# Patient Record
Sex: FEMALE | Race: WHITE | NOT HISPANIC OR LATINO
[De-identification: names, ages, dates, MRNs, and addresses within clinical notes are randomized per-mention and may not be internally consistent; named-entity substitution may affect disease eponyms.]

---

## 2022-07-20 DIAGNOSIS — Z92.89 PERSONAL HISTORY OF OTHER MEDICAL TREATMENT: ICD-10-CM

## 2022-07-20 DIAGNOSIS — Z82.3 FAMILY HISTORY OF STROKE: ICD-10-CM

## 2022-07-20 DIAGNOSIS — R21 RASH AND OTHER NONSPECIFIC SKIN ERUPTION: ICD-10-CM

## 2022-07-20 DIAGNOSIS — Z01.89 ENCOUNTER FOR OTHER SPECIFIED SPECIAL EXAMINATIONS: ICD-10-CM

## 2022-07-20 DIAGNOSIS — Z80.0 FAMILY HISTORY OF MALIGNANT NEOPLASM OF DIGESTIVE ORGANS: ICD-10-CM

## 2022-07-20 DIAGNOSIS — Z78.9 OTHER SPECIFIED HEALTH STATUS: ICD-10-CM

## 2022-07-20 DIAGNOSIS — Z87.891 PERSONAL HISTORY OF NICOTINE DEPENDENCE: ICD-10-CM

## 2022-07-20 RX ORDER — MONTELUKAST 10 MG/1
10 TABLET, FILM COATED ORAL
Refills: 0 | Status: ACTIVE | COMMUNITY

## 2022-07-26 ENCOUNTER — APPOINTMENT (OUTPATIENT)
Dept: INTERNAL MEDICINE | Facility: CLINIC | Age: 67
End: 2022-07-26

## 2022-07-26 ENCOUNTER — NON-APPOINTMENT (OUTPATIENT)
Age: 67
End: 2022-07-26

## 2022-07-26 VITALS
HEIGHT: 64.5 IN | WEIGHT: 190 LBS | BODY MASS INDEX: 32.04 KG/M2 | TEMPERATURE: 98 F | RESPIRATION RATE: 18 BRPM | DIASTOLIC BLOOD PRESSURE: 76 MMHG | OXYGEN SATURATION: 98 % | HEART RATE: 88 BPM | SYSTOLIC BLOOD PRESSURE: 144 MMHG

## 2022-07-26 DIAGNOSIS — Z00.00 ENCOUNTER FOR GENERAL ADULT MEDICAL EXAMINATION W/OUT ABNORMAL FINDINGS: ICD-10-CM

## 2022-07-26 LAB
BILIRUB UR QL STRIP: NEGATIVE
CLARITY UR: CLEAR
COLLECTION METHOD: NORMAL
GLUCOSE UR-MCNC: NEGATIVE
HCG UR QL: 0.2 EU/DL
HGB UR QL STRIP.AUTO: NEGATIVE
KETONES UR-MCNC: NEGATIVE
LEUKOCYTE ESTERASE UR QL STRIP: NEGATIVE
NITRITE UR QL STRIP: NEGATIVE
PH UR STRIP: 5
PROT UR STRIP-MCNC: NEGATIVE
SP GR UR STRIP: 1

## 2022-07-26 PROCEDURE — 93000 ELECTROCARDIOGRAM COMPLETE: CPT | Mod: 59

## 2022-07-26 PROCEDURE — 81003 URINALYSIS AUTO W/O SCOPE: CPT | Mod: QW

## 2022-07-26 PROCEDURE — 99213 OFFICE O/P EST LOW 20 MIN: CPT | Mod: 25

## 2022-07-26 PROCEDURE — 36415 COLL VENOUS BLD VENIPUNCTURE: CPT

## 2022-07-26 PROCEDURE — G0444 DEPRESSION SCREEN ANNUAL: CPT | Mod: 59

## 2022-07-26 PROCEDURE — G0439: CPT

## 2022-07-26 RX ORDER — METRONIDAZOLE 7.5 MG/G
0.75 GEL TOPICAL
Qty: 45 | Refills: 0 | Status: ACTIVE | COMMUNITY
Start: 2022-05-05

## 2022-07-26 RX ORDER — METRONIDAZOLE 10 MG/G
1 GEL TOPICAL
Refills: 0 | Status: DISCONTINUED | COMMUNITY
End: 2022-07-26

## 2022-07-26 RX ORDER — BRIMONIDINE TARTRATE 1 MG/ML
0.1 SOLUTION/ DROPS OPHTHALMIC
Qty: 15 | Refills: 0 | Status: ACTIVE | COMMUNITY
Start: 2022-05-04

## 2022-07-26 RX ORDER — FLUTICASONE PROPIONATE AND SALMETEROL 50; 250 UG/1; UG/1
250-50 POWDER RESPIRATORY (INHALATION)
Qty: 180 | Refills: 0 | Status: ACTIVE | COMMUNITY
Start: 2022-04-11

## 2022-07-26 RX ORDER — ALBUTEROL SULFATE 90 UG/1
108 (90 BASE) INHALANT RESPIRATORY (INHALATION)
Qty: 8 | Refills: 0 | Status: ACTIVE | COMMUNITY
Start: 2022-07-19

## 2022-07-26 NOTE — HEALTH RISK ASSESSMENT
[Never] : Never [Yes] : Yes [2 - 3 times a week (3 pts)] : 2 - 3  times a week (3 points) [1 or 2 (0 pts)] : 1 or 2 (0 points) [Never (0 pts)] : Never (0 points) [No] : In the past 12 months have you used drugs other than those required for medical reasons? No [No falls in past year] : Patient reported no falls in the past year [0] : 2) Feeling down, depressed, or hopeless: Not at all (0) [PHQ-2 Negative - No further assessment needed] : PHQ-2 Negative - No further assessment needed [Patient reported colonoscopy was normal] : Patient reported colonoscopy was normal [HIV test declined] : HIV test declined [Hepatitis C test declined] : Hepatitis C test declined [Fully functional (bathing, dressing, toileting, transferring, walking, feeding)] : Fully functional (bathing, dressing, toileting, transferring, walking, feeding) [Fully functional (using the telephone, shopping, preparing meals, housekeeping, doing laundry, using] : Fully functional and needs no help or supervision to perform IADLs (using the telephone, shopping, preparing meals, housekeeping, doing laundry, using transportation, managing medications and managing finances) [Patient/Caregiver not ready to engage] : , patient/caregiver not ready to engage [Reviewed no changes] : Reviewed, no changes [I will adhere to the patient's wishes.] : I will adhere to the patient's wishes. [Time Spent: ___ minutes] : Time Spent: [unfilled] minutes [[id number]] : 0 [ColonoscopyDate] : 12/18 [ColonoscopyComments] : next 10 yrs [AdvancecareDate] : 7/26/22 [FreeTextEntry4] : will do forms.\par Discussed with patient.  Suggested the discussion with the healthcare proxy about quality of life issues.  Suggested a discussion on withholding treatment and no further therapy in any given circumstance as determined by the patient.\par

## 2022-07-26 NOTE — PHYSICAL EXAM
[Normal] : normal gait, coordination grossly intact, no focal deficits [de-identified] : declined

## 2022-07-26 NOTE — HISTORY OF PRESENT ILLNESS
[de-identified] : Patient comes for a physical after a long absence.  Has been followed by pulmonary physician and continues on inhaler therapy without significant changes in respiratory effort or exertional effort.  Also followed regularly by dermatology.\par Recently noted to have elevated blood pressure at a physician's office of approximately 140s/80.  No symptoms.\par Will be going to [facility] to get the shingles vaccine, wants to wait because recently had second booster of COVID.\par

## 2022-07-27 LAB
ALBUMIN SERPL ELPH-MCNC: 4.6 G/DL
ALP BLD-CCNC: 71 U/L
ALT SERPL-CCNC: 19 U/L
ANION GAP SERPL CALC-SCNC: 12 MMOL/L
AST SERPL-CCNC: 21 U/L
BASOPHILS # BLD AUTO: 0.02 K/UL
BASOPHILS NFR BLD AUTO: 0.5 %
BILIRUB SERPL-MCNC: 0.3 MG/DL
BUN SERPL-MCNC: 14 MG/DL
CALCIUM SERPL-MCNC: 10 MG/DL
CHLORIDE SERPL-SCNC: 105 MMOL/L
CHOLEST SERPL-MCNC: 226 MG/DL
CO2 SERPL-SCNC: 22 MMOL/L
CREAT SERPL-MCNC: 0.92 MG/DL
EGFR: 68 ML/MIN/1.73M2
EOSINOPHIL # BLD AUTO: 0.22 K/UL
EOSINOPHIL NFR BLD AUTO: 5.7 %
ESTIMATED AVERAGE GLUCOSE: 114 MG/DL
GLUCOSE SERPL-MCNC: 99 MG/DL
HBA1C MFR BLD HPLC: 5.6 %
HCT VFR BLD CALC: 42.5 %
HDLC SERPL-MCNC: 65 MG/DL
HGB BLD-MCNC: 14 G/DL
IMM GRANULOCYTES NFR BLD AUTO: 0.3 %
LDLC SERPL CALC-MCNC: 129 MG/DL
LYMPHOCYTES # BLD AUTO: 1.2 K/UL
LYMPHOCYTES NFR BLD AUTO: 31 %
MAN DIFF?: NORMAL
MCHC RBC-ENTMCNC: 31.6 PG
MCHC RBC-ENTMCNC: 32.9 GM/DL
MCV RBC AUTO: 95.9 FL
MONOCYTES # BLD AUTO: 0.43 K/UL
MONOCYTES NFR BLD AUTO: 11.1 %
NEUTROPHILS # BLD AUTO: 1.99 K/UL
NEUTROPHILS NFR BLD AUTO: 51.4 %
NONHDLC SERPL-MCNC: 161 MG/DL
PLATELET # BLD AUTO: 294 K/UL
POTASSIUM SERPL-SCNC: 4.6 MMOL/L
PROT SERPL-MCNC: 7.5 G/DL
RBC # BLD: 4.43 M/UL
RBC # FLD: 13.6 %
SODIUM SERPL-SCNC: 139 MMOL/L
TRIGL SERPL-MCNC: 161 MG/DL
TSH SERPL-ACNC: 1.33 UIU/ML
WBC # FLD AUTO: 3.87 K/UL

## 2022-08-05 DIAGNOSIS — M85.80 OTHER SPECIFIED DISORDERS OF BONE DENSITY AND STRUCTURE, UNSPECIFIED SITE: ICD-10-CM

## 2022-08-18 ENCOUNTER — RESULT REVIEW (OUTPATIENT)
Age: 67
End: 2022-08-18

## 2022-08-24 ENCOUNTER — APPOINTMENT (OUTPATIENT)
Dept: INTERNAL MEDICINE | Facility: CLINIC | Age: 67
End: 2022-08-24

## 2022-08-24 VITALS
RESPIRATION RATE: 16 BRPM | HEART RATE: 71 BPM | DIASTOLIC BLOOD PRESSURE: 88 MMHG | OXYGEN SATURATION: 97 % | SYSTOLIC BLOOD PRESSURE: 154 MMHG | WEIGHT: 198 LBS | HEIGHT: 64 IN | BODY MASS INDEX: 33.8 KG/M2

## 2022-08-24 DIAGNOSIS — J30.2 OTHER SEASONAL ALLERGIC RHINITIS: ICD-10-CM

## 2022-08-24 PROCEDURE — 36415 COLL VENOUS BLD VENIPUNCTURE: CPT

## 2022-08-24 PROCEDURE — 99213 OFFICE O/P EST LOW 20 MIN: CPT | Mod: 25

## 2022-08-24 NOTE — HISTORY OF PRESENT ILLNESS
[de-identified] : Patient started on cholesterol medications.  Initially had some mild body aches but that has resolved.  Taking medications without any problems now.\par Complaining of sore throat and postnasal drip with increased seasonal allergies.\par

## 2022-08-25 ENCOUNTER — TRANSCRIPTION ENCOUNTER (OUTPATIENT)
Age: 67
End: 2022-08-25

## 2022-08-26 LAB
ALBUMIN SERPL ELPH-MCNC: 4.4 G/DL
ALP BLD-CCNC: 72 U/L
ALT SERPL-CCNC: 21 U/L
ANION GAP SERPL CALC-SCNC: 12 MMOL/L
AST SERPL-CCNC: 20 U/L
BILIRUB SERPL-MCNC: 0.2 MG/DL
BUN SERPL-MCNC: 15 MG/DL
CALCIUM SERPL-MCNC: 9.6 MG/DL
CHLORIDE SERPL-SCNC: 105 MMOL/L
CHOLEST SERPL-MCNC: 179 MG/DL
CO2 SERPL-SCNC: 22 MMOL/L
CREAT SERPL-MCNC: 1.05 MG/DL
EGFR: 58 ML/MIN/1.73M2
GLUCOSE SERPL-MCNC: 91 MG/DL
HDLC SERPL-MCNC: 68 MG/DL
LDLC SERPL CALC-MCNC: 69 MG/DL
NONHDLC SERPL-MCNC: 111 MG/DL
POTASSIUM SERPL-SCNC: 4.4 MMOL/L
PROT SERPL-MCNC: 7.5 G/DL
SODIUM SERPL-SCNC: 139 MMOL/L
TRIGL SERPL-MCNC: 210 MG/DL

## 2022-09-30 ENCOUNTER — MED ADMIN CHARGE (OUTPATIENT)
Age: 67
End: 2022-09-30

## 2023-03-22 ENCOUNTER — RX RENEWAL (OUTPATIENT)
Age: 68
End: 2023-03-22

## 2023-05-22 ENCOUNTER — OFFICE (OUTPATIENT)
Dept: URBAN - METROPOLITAN AREA CLINIC 29 | Facility: CLINIC | Age: 68
Setting detail: OPHTHALMOLOGY
End: 2023-05-22
Payer: MEDICARE

## 2023-05-22 ENCOUNTER — RX ONLY (RX ONLY)
Age: 68
End: 2023-05-22

## 2023-05-22 DIAGNOSIS — H52.4: ICD-10-CM

## 2023-05-22 DIAGNOSIS — H40.1111: ICD-10-CM

## 2023-05-22 DIAGNOSIS — H25.13: ICD-10-CM

## 2023-05-22 PROCEDURE — 92133 CPTRZD OPH DX IMG PST SGM ON: CPT | Performed by: OPHTHALMOLOGY

## 2023-05-22 PROCEDURE — 76514 ECHO EXAM OF EYE THICKNESS: CPT | Performed by: OPHTHALMOLOGY

## 2023-05-22 PROCEDURE — 92015 DETERMINE REFRACTIVE STATE: CPT | Performed by: OPHTHALMOLOGY

## 2023-05-22 PROCEDURE — 92014 COMPRE OPH EXAM EST PT 1/>: CPT | Performed by: OPHTHALMOLOGY

## 2023-05-22 ASSESSMENT — REFRACTION_CURRENTRX
OD_SPHERE: +2.75
OS_CYLINDER: SPH
OD_AXIS: 017
OD_CYLINDER: +0.50
OS_SPHERE: +3.00
OS_OVR_VA: 20/
OD_OVR_VA: 20/
OS_AXIS: 090

## 2023-05-22 ASSESSMENT — PACHYMETRY
OD_CT_CORRECTION: 0
OS_CT_CORRECTION: -1
OS_CT_UM: 550
OD_CT_UM: 541

## 2023-05-22 ASSESSMENT — SPHEQUIV_DERIVED
OD_SPHEQUIV: 1.375
OD_SPHEQUIV: 0.875

## 2023-05-22 ASSESSMENT — KERATOMETRY
OS_K2POWER_DIOPTERS: 47.00
OD_AXISANGLE_DEGREES: 080
OS_K1POWER_DIOPTERS: 45.75
OS_AXISANGLE_DEGREES: 091
OD_K2POWER_DIOPTERS: 45.75
OD_K1POWER_DIOPTERS: 44.75

## 2023-05-22 ASSESSMENT — REFRACTION_AUTOREFRACTION
OD_SPHERE: +1.00
OS_AXIS: 020
OS_CYLINDER: +0.50
OD_AXIS: 042
OD_CYLINDER: +0.75
OS_SPHERE: PLANO

## 2023-05-22 ASSESSMENT — REFRACTION_MANIFEST
OD_CYLINDER: +0.75
OD_VA1: 20/25
OS_SPHERE: PLANO
OD_SPHERE: +0.50
OS_ADD: +2.50
OD_ADD: +2.50
OD_AXIS: 030
OS_CYLINDER: SPH

## 2023-05-22 ASSESSMENT — VISUAL ACUITY
OD_BCVA: 20/30+2
OS_BCVA: 20/40

## 2023-05-22 ASSESSMENT — CONFRONTATIONAL VISUAL FIELD TEST (CVF)
OD_FINDINGS: FULL
OS_FINDINGS: FULL

## 2023-05-22 ASSESSMENT — AXIALLENGTH_DERIVED
OD_AL: 22.6476
OD_AL: 22.4695

## 2023-08-16 ENCOUNTER — APPOINTMENT (OUTPATIENT)
Dept: INTERNAL MEDICINE | Facility: CLINIC | Age: 68
End: 2023-08-16
Payer: MEDICARE

## 2023-08-16 ENCOUNTER — NON-APPOINTMENT (OUTPATIENT)
Age: 68
End: 2023-08-16

## 2023-08-16 VITALS
BODY MASS INDEX: 35 KG/M2 | HEIGHT: 64 IN | HEART RATE: 64 BPM | TEMPERATURE: 98.2 F | WEIGHT: 205 LBS | RESPIRATION RATE: 16 BRPM | OXYGEN SATURATION: 97 % | SYSTOLIC BLOOD PRESSURE: 166 MMHG | DIASTOLIC BLOOD PRESSURE: 94 MMHG

## 2023-08-16 DIAGNOSIS — J45.909 UNSPECIFIED ASTHMA, UNCOMPLICATED: ICD-10-CM

## 2023-08-16 DIAGNOSIS — Z00.00 ENCOUNTER FOR GENERAL ADULT MEDICAL EXAMINATION W/OUT ABNORMAL FINDINGS: ICD-10-CM

## 2023-08-16 DIAGNOSIS — E78.2 MIXED HYPERLIPIDEMIA: ICD-10-CM

## 2023-08-16 PROCEDURE — G0439: CPT

## 2023-08-16 PROCEDURE — 36415 COLL VENOUS BLD VENIPUNCTURE: CPT

## 2023-08-16 PROCEDURE — 93000 ELECTROCARDIOGRAM COMPLETE: CPT

## 2023-08-16 PROCEDURE — 99213 OFFICE O/P EST LOW 20 MIN: CPT | Mod: 25

## 2023-08-16 RX ORDER — ERYTHROMYCIN 5 MG/G
5 OINTMENT OPHTHALMIC
Qty: 4 | Refills: 0 | Status: DISCONTINUED | COMMUNITY
Start: 2022-06-06 | End: 2023-08-16

## 2023-08-16 NOTE — HEALTH RISK ASSESSMENT
[Yes] : Yes [2 - 4 times a month (2 pts)] : 2-4 times a month (2 points) [1 or 2 (0 pts)] : 1 or 2 (0 points) [No] : In the past 12 months have you used drugs other than those required for medical reasons? No [0] : 2) Feeling down, depressed, or hopeless: Not at all (0) [PHQ-2 Negative - No further assessment needed] : PHQ-2 Negative - No further assessment needed [Never] : Never [Never (0 pts)] : Never (0 points) [No falls in past year] : Patient reported no falls in the past year [Audit-CScore] : 2 [[id number]] : 0 [Patient reported mammogram was normal] : Patient reported mammogram was normal [Patient reported bone density results were normal] : Patient reported bone density results were normal [Patient reported colonoscopy was normal] : Patient reported colonoscopy was normal [Fully functional (bathing, dressing, toileting, transferring, walking, feeding)] : Fully functional (bathing, dressing, toileting, transferring, walking, feeding) [Fully functional (using the telephone, shopping, preparing meals, housekeeping, doing laundry, using] : Fully functional and needs no help or supervision to perform IADLs (using the telephone, shopping, preparing meals, housekeeping, doing laundry, using transportation, managing medications and managing finances) [MammogramDate] : 8/22 [BoneDensityDate] : 8/22 [ColonoscopyDate] : 12/18 [ColonoscopyComments] : 10 yrs [Patient/Caregiver unclear of wishes] : , patient/caregiver unclear of wishes [I will adhere to the patient's wishes.] : I will adhere to the patient's wishes. [AdvancecareDate] : 08/16/23

## 2023-08-16 NOTE — HISTORY OF PRESENT ILLNESS
[de-identified] : Patient comes for a physical with no overall changes in exertional or functional ability.  Taking all the same medications without any changes.  Sees pulmonary physician without any changes. No acute complaints. Scheduled for mammogram tomorrow.

## 2023-08-17 LAB
ALBUMIN SERPL ELPH-MCNC: 4.5 G/DL
ALP BLD-CCNC: 75 U/L
ALT SERPL-CCNC: 17 U/L
ANION GAP SERPL CALC-SCNC: 13 MMOL/L
AST SERPL-CCNC: 18 U/L
BILIRUB SERPL-MCNC: 0.2 MG/DL
BUN SERPL-MCNC: 10 MG/DL
CALCIUM SERPL-MCNC: 9.8 MG/DL
CHLORIDE SERPL-SCNC: 103 MMOL/L
CHOLEST SERPL-MCNC: 185 MG/DL
CO2 SERPL-SCNC: 24 MMOL/L
CREAT SERPL-MCNC: 0.9 MG/DL
EGFR: 70 ML/MIN/1.73M2
GLUCOSE SERPL-MCNC: 88 MG/DL
HDLC SERPL-MCNC: 64 MG/DL
LDLC SERPL CALC-MCNC: 83 MG/DL
NONHDLC SERPL-MCNC: 121 MG/DL
POTASSIUM SERPL-SCNC: 4.2 MMOL/L
PROT SERPL-MCNC: 7.3 G/DL
SODIUM SERPL-SCNC: 140 MMOL/L
TRIGL SERPL-MCNC: 232 MG/DL
TSH SERPL-ACNC: 1.65 UIU/ML

## 2023-08-21 ENCOUNTER — RESULT REVIEW (OUTPATIENT)
Age: 68
End: 2023-08-21

## 2023-08-30 ENCOUNTER — RX RENEWAL (OUTPATIENT)
Age: 68
End: 2023-08-30

## 2023-10-11 ENCOUNTER — OFFICE (OUTPATIENT)
Dept: URBAN - METROPOLITAN AREA CLINIC 29 | Facility: CLINIC | Age: 68
Setting detail: OPHTHALMOLOGY
End: 2023-10-11
Payer: MEDICARE

## 2023-10-11 DIAGNOSIS — H10.45: ICD-10-CM

## 2023-10-11 DIAGNOSIS — H25.13: ICD-10-CM

## 2023-10-11 DIAGNOSIS — H40.1111: ICD-10-CM

## 2023-10-11 PROCEDURE — 92250 FUNDUS PHOTOGRAPHY W/I&R: CPT | Performed by: OPHTHALMOLOGY

## 2023-10-11 PROCEDURE — 99213 OFFICE O/P EST LOW 20 MIN: CPT | Performed by: OPHTHALMOLOGY

## 2023-10-11 PROCEDURE — 92083 EXTENDED VISUAL FIELD XM: CPT | Performed by: OPHTHALMOLOGY

## 2023-10-11 ASSESSMENT — PACHYMETRY
OD_CT_UM: 541
OS_CT_CORRECTION: -1
OD_CT_CORRECTION: 0
OS_CT_UM: 550

## 2023-10-11 ASSESSMENT — TONOMETRY
OS_IOP_MMHG: 15
OD_IOP_MMHG: 15

## 2023-10-11 ASSESSMENT — CONFRONTATIONAL VISUAL FIELD TEST (CVF)
OD_FINDINGS: FULL
OS_FINDINGS: FULL

## 2023-11-03 PROBLEM — H10.45 ALLERGIC CONJUNCTIVITIS ; RIGHT EYE: Status: ACTIVE | Noted: 2023-10-11

## 2023-11-03 ASSESSMENT — VISUAL ACUITY
OD_BCVA: 20/25
OS_BCVA: 20/40-2

## 2023-11-14 ASSESSMENT — KERATOMETRY
OD_K2POWER_DIOPTERS: 45.75
OS_K1POWER_DIOPTERS: 45.75
OS_AXISANGLE_DEGREES: 091
OS_K2POWER_DIOPTERS: 47.00
OD_K1POWER_DIOPTERS: 44.75
OD_AXISANGLE_DEGREES: 080

## 2023-11-14 ASSESSMENT — REFRACTION_AUTOREFRACTION
OD_SPHERE: +1.00
OS_SPHERE: PLANO
OD_CYLINDER: +0.75
OS_AXIS: 020
OS_CYLINDER: +0.50
OD_AXIS: 042

## 2023-11-14 ASSESSMENT — REFRACTION_CURRENTRX
OD_OVR_VA: 20/
OS_AXIS: 090
OS_OVR_VA: 20/
OD_AXIS: 017
OD_CYLINDER: +0.50
OS_SPHERE: +3.00
OS_CYLINDER: SPH
OD_SPHERE: +2.75

## 2023-11-14 ASSESSMENT — REFRACTION_MANIFEST
OD_CYLINDER: +0.75
OD_SPHERE: +0.50
OD_AXIS: 030
OD_ADD: +2.50
OD_VA1: 20/25
OS_CYLINDER: SPH
OS_SPHERE: PLANO
OS_ADD: +2.50

## 2023-11-14 ASSESSMENT — SPHEQUIV_DERIVED
OD_SPHEQUIV: 1.375
OD_SPHEQUIV: 0.875

## 2023-11-14 ASSESSMENT — AXIALLENGTH_DERIVED
OD_AL: 22.4695
OD_AL: 22.6476

## 2024-02-27 ENCOUNTER — OFFICE (OUTPATIENT)
Dept: URBAN - METROPOLITAN AREA CLINIC 29 | Facility: CLINIC | Age: 69
Setting detail: OPHTHALMOLOGY
End: 2024-02-27
Payer: MEDICARE

## 2024-02-27 DIAGNOSIS — H25.13: ICD-10-CM

## 2024-02-27 DIAGNOSIS — H40.1111: ICD-10-CM

## 2024-02-27 PROCEDURE — 92014 COMPRE OPH EXAM EST PT 1/>: CPT | Performed by: OPHTHALMOLOGY

## 2024-02-27 ASSESSMENT — REFRACTION_CURRENTRX
OS_CYLINDER: SPH
OS_OVR_VA: 20/
OS_SPHERE: +3.00
OD_CYLINDER: +0.50
OD_AXIS: 017
OD_SPHERE: +2.75
OS_AXIS: 090
OD_OVR_VA: 20/

## 2024-02-27 ASSESSMENT — REFRACTION_AUTOREFRACTION
OS_AXIS: 020
OS_SPHERE: PLANO
OD_SPHERE: +1.00
OD_CYLINDER: +0.75
OD_AXIS: 042
OS_CYLINDER: +0.50

## 2024-02-27 ASSESSMENT — REFRACTION_MANIFEST
OD_VA1: 20/25
OS_ADD: +2.50
OD_ADD: +2.50
OD_CYLINDER: +0.75
OD_SPHERE: +0.50
OD_AXIS: 030
OS_CYLINDER: SPH
OS_SPHERE: PLANO

## 2024-02-27 ASSESSMENT — CONFRONTATIONAL VISUAL FIELD TEST (CVF)
OD_FINDINGS: FULL
OS_FINDINGS: FULL

## 2024-02-27 ASSESSMENT — SPHEQUIV_DERIVED
OD_SPHEQUIV: 0.875
OD_SPHEQUIV: 1.375

## 2024-03-04 ENCOUNTER — RX RENEWAL (OUTPATIENT)
Age: 69
End: 2024-03-04

## 2024-03-04 RX ORDER — ATORVASTATIN CALCIUM 10 MG/1
10 TABLET, FILM COATED ORAL DAILY
Qty: 90 | Refills: 1 | Status: ACTIVE | COMMUNITY
Start: 2022-07-27 | End: 1900-01-01

## 2024-08-23 ENCOUNTER — RX RENEWAL (OUTPATIENT)
Age: 69
End: 2024-08-23

## 2024-09-06 ENCOUNTER — APPOINTMENT (OUTPATIENT)
Dept: INTERNAL MEDICINE | Facility: CLINIC | Age: 69
End: 2024-09-06
Payer: MEDICARE

## 2024-09-06 ENCOUNTER — NON-APPOINTMENT (OUTPATIENT)
Age: 69
End: 2024-09-06

## 2024-09-06 VITALS
OXYGEN SATURATION: 98 % | HEIGHT: 64 IN | BODY MASS INDEX: 33.8 KG/M2 | TEMPERATURE: 98 F | SYSTOLIC BLOOD PRESSURE: 134 MMHG | RESPIRATION RATE: 16 BRPM | DIASTOLIC BLOOD PRESSURE: 78 MMHG | HEART RATE: 79 BPM | WEIGHT: 198 LBS

## 2024-09-06 DIAGNOSIS — Z00.00 ENCOUNTER FOR GENERAL ADULT MEDICAL EXAMINATION W/OUT ABNORMAL FINDINGS: ICD-10-CM

## 2024-09-06 DIAGNOSIS — Z23 ENCOUNTER FOR IMMUNIZATION: ICD-10-CM

## 2024-09-06 PROCEDURE — 93000 ELECTROCARDIOGRAM COMPLETE: CPT

## 2024-09-06 PROCEDURE — G0008: CPT

## 2024-09-06 PROCEDURE — G0439: CPT

## 2024-09-06 PROCEDURE — 90662 IIV NO PRSV INCREASED AG IM: CPT

## 2024-09-06 PROCEDURE — 36415 COLL VENOUS BLD VENIPUNCTURE: CPT

## 2024-09-06 NOTE — HEALTH RISK ASSESSMENT
[Good] : ~his/her~  mood as  good [Yes] : Yes [1 or 2 (0 pts)] : 1 or 2 (0 points) [Never (0 pts)] : Never (0 points) [No] : In the past 12 months have you used drugs other than those required for medical reasons? No [Little interest or pleasure doing things] : 1) Little interest or pleasure doing things [Feeling down, depressed, or hopeless] : 2) Feeling down, depressed, or hopeless [0] : 2) Feeling down, depressed, or hopeless: Not at all (0) [PHQ-2 Negative - No further assessment needed] : PHQ-2 Negative - No further assessment needed [Never] : Never [2 - 4 times a month (2 pts)] : 2-4 times a month (2 points) [Audit-CScore] : 2 [HIV test declined] : HIV test declined [[id number]] : 0 [Hepatitis C test declined] : Hepatitis C test declined [I will adhere to the patient's wishes.] : I will adhere to the patient's wishes.

## 2024-09-06 NOTE — HISTORY OF PRESENT ILLNESS
[de-identified] : Patient comes for a physical with overall no changes in exertional or functional ability.  Has not seen any other medical care provider except for pulmonary and has no changes in overall medical care needs. No acute complaints. Scheduled for mammogram next Thursday.

## 2024-09-06 NOTE — PHYSICAL EXAM
[Normal] : normal gait, coordination grossly intact, no focal deficits [de-identified] : Declined [de-identified] : Partial exam

## 2024-09-07 LAB
ALBUMIN SERPL ELPH-MCNC: 4.6 G/DL
ALP BLD-CCNC: 81 U/L
ALT SERPL-CCNC: 17 U/L
ANION GAP SERPL CALC-SCNC: 10 MMOL/L
AST SERPL-CCNC: 18 U/L
BILIRUB SERPL-MCNC: 0.3 MG/DL
BUN SERPL-MCNC: 14 MG/DL
CALCIUM SERPL-MCNC: 9.9 MG/DL
CHLORIDE SERPL-SCNC: 104 MMOL/L
CHOLEST SERPL-MCNC: 184 MG/DL
CO2 SERPL-SCNC: 27 MMOL/L
CREAT SERPL-MCNC: 0.97 MG/DL
EGFR: 63 ML/MIN/1.73M2
ESTIMATED AVERAGE GLUCOSE: 114 MG/DL
GLUCOSE SERPL-MCNC: 109 MG/DL
HBA1C MFR BLD HPLC: 5.6 %
HCT VFR BLD CALC: 44.6 %
HDLC SERPL-MCNC: 74 MG/DL
HGB BLD-MCNC: 13.9 G/DL
LDLC SERPL CALC-MCNC: 77 MG/DL
MCHC RBC-ENTMCNC: 31.1 PG
MCHC RBC-ENTMCNC: 31.2 GM/DL
MCV RBC AUTO: 99.8 FL
NONHDLC SERPL-MCNC: 110 MG/DL
PLATELET # BLD AUTO: 311 K/UL
POTASSIUM SERPL-SCNC: 4.2 MMOL/L
PROT SERPL-MCNC: 7.8 G/DL
RBC # BLD: 4.47 M/UL
RBC # FLD: 14.3 %
SODIUM SERPL-SCNC: 140 MMOL/L
TRIGL SERPL-MCNC: 206 MG/DL
TSH SERPL-ACNC: 1.37 UIU/ML
WBC # FLD AUTO: 4.92 K/UL

## 2024-09-12 ENCOUNTER — RESULT REVIEW (OUTPATIENT)
Age: 69
End: 2024-09-12

## 2025-02-19 ENCOUNTER — RX RENEWAL (OUTPATIENT)
Age: 70
End: 2025-02-19

## 2025-09-15 ENCOUNTER — RESULT REVIEW (OUTPATIENT)
Age: 70
End: 2025-09-15

## 2025-09-19 ENCOUNTER — APPOINTMENT (OUTPATIENT)
Dept: INTERNAL MEDICINE | Facility: CLINIC | Age: 70
End: 2025-09-19
Payer: MEDICARE

## 2025-09-19 VITALS
OXYGEN SATURATION: 96 % | DIASTOLIC BLOOD PRESSURE: 85 MMHG | WEIGHT: 199 LBS | RESPIRATION RATE: 16 BRPM | SYSTOLIC BLOOD PRESSURE: 129 MMHG | HEIGHT: 64 IN | BODY MASS INDEX: 33.97 KG/M2 | TEMPERATURE: 97.9 F | HEART RATE: 75 BPM

## 2025-09-19 DIAGNOSIS — Z23 ENCOUNTER FOR IMMUNIZATION: ICD-10-CM

## 2025-09-19 DIAGNOSIS — Z00.00 ENCOUNTER FOR GENERAL ADULT MEDICAL EXAMINATION W/OUT ABNORMAL FINDINGS: ICD-10-CM

## 2025-09-19 PROCEDURE — 93000 ELECTROCARDIOGRAM COMPLETE: CPT

## 2025-09-19 PROCEDURE — 36415 COLL VENOUS BLD VENIPUNCTURE: CPT

## 2025-09-19 PROCEDURE — G0008: CPT

## 2025-09-19 PROCEDURE — 90662 IIV NO PRSV INCREASED AG IM: CPT

## 2025-09-19 PROCEDURE — G0439: CPT

## 2025-09-22 LAB
ALBUMIN SERPL ELPH-MCNC: 4.3 G/DL
ALP BLD-CCNC: 75 U/L
ALT SERPL-CCNC: 20 U/L
ANION GAP SERPL CALC-SCNC: 14 MMOL/L
AST SERPL-CCNC: 20 U/L
BILIRUB SERPL-MCNC: 0.3 MG/DL
BUN SERPL-MCNC: 15 MG/DL
CALCIUM SERPL-MCNC: 9.9 MG/DL
CHLORIDE SERPL-SCNC: 104 MMOL/L
CHOLEST SERPL-MCNC: 179 MG/DL
CO2 SERPL-SCNC: 20 MMOL/L
CREAT SERPL-MCNC: 0.95 MG/DL
EGFRCR SERPLBLD CKD-EPI 2021: 64 ML/MIN/1.73M2
GLUCOSE SERPL-MCNC: 93 MG/DL
HCT VFR BLD CALC: 42 %
HDLC SERPL-MCNC: 78 MG/DL
HGB BLD-MCNC: 13.6 G/DL
LDLC SERPL-MCNC: 76 MG/DL
MCHC RBC-ENTMCNC: 31.6 PG
MCHC RBC-ENTMCNC: 32.4 G/DL
MCV RBC AUTO: 97.4 FL
NONHDLC SERPL-MCNC: 101 MG/DL
PLATELET # BLD AUTO: 310 K/UL
POTASSIUM SERPL-SCNC: 4.4 MMOL/L
PROT SERPL-MCNC: 7.4 G/DL
RBC # BLD: 4.31 M/UL
RBC # FLD: 14.1 %
SODIUM SERPL-SCNC: 139 MMOL/L
TRIGL SERPL-MCNC: 144 MG/DL
TSH SERPL-ACNC: 1.15 UIU/ML
WBC # FLD AUTO: 5.26 K/UL